# Patient Record
(demographics unavailable — no encounter records)

---

## 2025-04-16 NOTE — PHYSICAL EXAM
[NL] : warm, clear [FreeTextEntry1] : NAD mild nasal congestion [FreeTextEntry2] : no sinus tenderness to percussion [FreeTextEntry3] : nl [FreeTextEntry4] : nl [de-identified] : nl

## 2025-04-16 NOTE — DISCUSSION/SUMMARY
[FreeTextEntry1] : home covid test neg father says.   nl PE Imp URI  no sinusitis at this time.  No antibiotic needed.  RTO if worse or not improving.  sx tx

## 2025-04-16 NOTE — HISTORY OF PRESENT ILLNESS
[FreeTextEntry6] : father Very stuffy nose, congested. x 3d bad, started a little earlier  Para nasal pain.  No HA  No Fever   No sick contacts   L foot fracture. Has boot. L lateral metatarsal fx.

## 2025-04-16 NOTE — PHYSICAL EXAM
[NL] : warm, clear [FreeTextEntry1] : NAD mild nasal congestion [FreeTextEntry2] : no sinus tenderness to percussion [FreeTextEntry3] : nl [FreeTextEntry4] : nl [de-identified] : nl

## 2025-07-01 NOTE — ASSESSMENT
[FreeTextEntry1] : #multiple nevi - benign, reassured - Sun protection was discussed. The proper use of broad-spectrum UVB/UVA sunscreens with SPF 30 or greater was reviewed and the need for re-application after swimming or sweating or 2-3 hours was emphasized. We talked about judicious use of clothing and avoidance of peak periods of sun exposure. I made the patient aware of the need for year-round protection. - I have counseled the patient on the importance of sun protection and monthly self skin exams at home. We have discussed proper sun protection habits and techniques, monthly self-skin exams and the ABCDEs of melanoma. I have asked the patient to notify me of any new or changing lesions.  RTC PRN

## 2025-07-01 NOTE — PHYSICAL EXAM
[FreeTextEntry3] : Focused skin exam per pt preference Fairly uniform and regular brown macules and papules on the face, trunk and extremities

## 2025-07-01 NOTE — HISTORY OF PRESENT ILLNESS
[FreeTextEntry1] : NPV - mole [de-identified] : 15 y/o F here for mole on face and back and legs -  one on leg is weird in color to them no PMH or FH of skin cancer